# Patient Record
(demographics unavailable — no encounter records)

---

## 2025-03-19 NOTE — HISTORY OF PRESENT ILLNESS
[Y] : Patient uses contraception [Condoms] : uses condoms [Menarche Age: ____] : age at menarche was [unfilled] [TextBox_4] : Pt had regular menses until 12/2024, missed January Had another menses on 2/26/25, and has been bleeding on/off since then [PapSmeardate] : 7/6/23 [TextBox_31] : NEG [ColonoscopyDate] : 2021 [TextBox_43] : NORM [HPVDate] : 7/6/23 [TextBox_78] : NEG [LMPDate] : 2/26/25 [PGHxTotal] : 4 [[facility]] : 2 [[facility]] : 2 [PGHxABSpont] : 2 [Ultrasound] : ultrasound [FreeTextEntry1] : 2/26/25

## 2025-04-09 NOTE — HISTORY OF PRESENT ILLNESS
[N] : Patient reports normal menses [Menarche Age: ____] : age at menarche was [unfilled] [No] : Patient does not have concerns regarding sex [Currently Active] : currently active [Men] : men [Y] : Patient uses contraception [Condoms] : uses condoms [Ultrasound] : ultrasound [TextBox_4] : well woman visit  No complaints.  Pt anemic. [Mammogramdate] : 05/20/22 [TextBox_19] : BR-1 [PapSmeardate] : 07/06/23 [TextBox_31] : NEG [HPVDate] : 07/06/23 [TextBox_78] : NEG [LMPDate] : 04/07/25 [PGHxTotal] : 4 [[facility]] : 2 [[facility]xLiving] : 2 [PGHxABSpont] : 2 [FreeTextEntry1] : 04/07/25 [FreeTextEntry4] : denies abuse

## 2025-04-09 NOTE — PHYSICAL EXAM
[Chaperoned Physical Exam] : A chaperone was present in the examining room during all aspects of the physical examination. [MA] : MA [Appropriately responsive] : appropriately responsive [Alert] : alert [No Acute Distress] : no acute distress [Soft] : soft [Non-tender] : non-tender [Non-distended] : non-distended [No HSM] : No HSM [No Lesions] : no lesions [No Mass] : no mass [Oriented x3] : oriented x3 [Examination Of The Breasts] : a normal appearance [No Masses] : no breast masses were palpable [Labia Majora] : normal [Labia Minora] : normal [Normal] : normal [Uterine Adnexae] : normal [FreeTextEntry2] : [place]ara [FreeTextEntry8] : negative

## 2025-04-09 NOTE — PLAN
[FreeTextEntry1] : well woman visit completed Stressed mammogram- scheduled 6/2025  Heavy menstrual bleeding Lysteda discussed with patient and rx sent.

## 2025-04-30 NOTE — HISTORY OF PRESENT ILLNESS
[N] : Patient reports normal menses [Condoms] : uses condoms [Y] : Positive pregnancy history [No] : Patient does not have concerns regarding sex [TextBox_4] : repeat PAP  due to heavy bleeding and unsatisfactory results on last PAP.  Feels well today, no complaints. [Mammogramdate] : 05/20/2022 [TextBox_19] : BR1 [Papeardate] : 04/09/2025 [TextBox_31] : UNSAT [HPVDate] : 04/09/2025 [TextBox_78] : NEG  [LMPDate] : 04/06/2025 [PGHxTotal] : 4 [[facility]] : 2 [[facility]] : 2 [PGHxABSpont] : 2 [FreeTextEntry1] : 04/06/2025

## 2025-04-30 NOTE — DISCUSSION/SUMMARY
[FreeTextEntry1] : 46 year old   (LMP 25) presents for follow up evaluation of repeat PAP due to heavy bleeding and unsatisfactory results on last PAP. Pap done. f/u results Routine GYN care

## 2025-04-30 NOTE — PHYSICAL EXAM
[Appropriately responsive] : appropriately responsive [Alert] : alert [No Acute Distress] : no acute distress [Oriented x3] : oriented x3 [Labia Majora] : normal [Labia Minora] : normal [Normal] : normal [Uterine Adnexae] : normal [Chaperoned Physical Exam] : A chaperone was present in the examining room during all aspects of the physical examination. [MA] : MA [FreeTextEntry2] : [place] [No Lymphadenopathy] : no lymphadenopathy [Soft] : soft [Non-tender] : non-tender [Non-distended] : non-distended [No HSM] : No HSM [No Lesions] : no lesions [No Mass] : no mass [FreeTextEntry8] : negative

## 2025-04-30 NOTE — END OF VISIT
[FreeTextEntry3] : I, Vivek Hong solely acted as scribe for Dr. Zaria Santillan on 04/30/2025 All medical entries made by the Scribe were at my, Dr. Santillan, direction and personally dictated by me on 04/30/2025 . I have reviewed the chart and agree that the record accurately reflects my personal performance of the history, physical exam, assessment and plan. I have also personally directed, reviewed, and agreed with the chart. [Time Spent: ___ minutes] : I have spent [unfilled] minutes of time on the encounter which excludes teaching and separately reported services. The patient is a 28y Female complaining of abdominal pain.

## 2025-05-21 NOTE — HISTORY OF PRESENT ILLNESS
[Condoms] : uses condoms [Y] : Positive pregnancy history [No] : Patient does not have concerns regarding sex [Currently Active] : currently active [Men] : men [TextBox_4] : Patient has been having irregular periods - LMP 5/2 ( Heavy for 2 days but didnt take lysteda) Prior was in March lasting 3 weeks ( 2/26-3/18) Apirl 6-11,  H/H - 98.8/30.8, getting IV infusions., [Mammogramdate] : 05/20/2022 [TextBox_19] : BR1 [BreastSonogramDate] : 01/05/2019 [TextBox_25] : BR4 [PapSmeardate] : 04/30/2025 [TextBox_31] : NEG  [HPVDate] : 04/30/2025 [TextBox_78] : NEG  [LMPDate] : 05/02/2025 [PGHxTotal] : 4 [[facility]xFull[facility]] : 2 [[facility]xLiving] : 2 [PGHxABSpont] : 2 [FreeTextEntry1] : 05/02/2025

## 2025-05-21 NOTE — PLAN
[FreeTextEntry1] : [unfilled] is present today with a complaint of abnormal bleeding. The patient describes the bleeding as ~  ~ . And it is effecting her daily functioning.   The patient was educated that certain times in a woman's life, it is not unusual for the menstrual cycle can become irregular. Some of the causes of abnormal uterine bleeding may include medications, ovulatory issues, miscarriage, polyps, fibroids, adenomyosis and in rare cases, malignancy.  The workup regarding abnormal bleeding was reviewed with the patient which includes getting a complete medical history, information regarding the bleeding, getting a full physical examination, blood work,  and ultrasound and possibly a hysteroscopy/EMB, if necessary.  Treatments were briefly reviewed with the patient. Treatment such as medication, Hormone therapy, Tranexamic acid, NSAIDS and surgery was discussed with the patient. Treatment plan would be determined after a full workup of the patient.